# Patient Record
Sex: FEMALE | Race: WHITE | NOT HISPANIC OR LATINO | Employment: PART TIME | URBAN - METROPOLITAN AREA
[De-identification: names, ages, dates, MRNs, and addresses within clinical notes are randomized per-mention and may not be internally consistent; named-entity substitution may affect disease eponyms.]

---

## 2023-10-31 ENCOUNTER — HOSPITAL ENCOUNTER (EMERGENCY)
Facility: HOSPITAL | Age: 68
Discharge: HOME | End: 2023-10-31
Attending: EMERGENCY MEDICINE
Payer: MEDICARE

## 2023-10-31 VITALS
TEMPERATURE: 98 F | BODY MASS INDEX: 25.96 KG/M2 | HEART RATE: 71 BPM | HEIGHT: 63 IN | OXYGEN SATURATION: 94 % | SYSTOLIC BLOOD PRESSURE: 125 MMHG | RESPIRATION RATE: 18 BRPM | DIASTOLIC BLOOD PRESSURE: 61 MMHG | WEIGHT: 146.5 LBS

## 2023-10-31 DIAGNOSIS — H57.89 IRRITATION OF LEFT EYE: Primary | ICD-10-CM

## 2023-10-31 PROCEDURE — 99283 EMERGENCY DEPT VISIT LOW MDM: CPT

## 2023-10-31 PROCEDURE — 25000003 PHARM REV CODE 250: Performed by: PHYSICIAN ASSISTANT

## 2023-10-31 RX ORDER — ERYTHROMYCIN 5 MG/G
OINTMENT OPHTHALMIC
Qty: 1 G | Refills: 0 | Status: SHIPPED | OUTPATIENT
Start: 2023-10-31

## 2023-10-31 RX ADMIN — FLUORESCEIN SODIUM AND BENOXINATE HYDROCHLORIDE 2 DROP: 2.5; 4 SOLUTION OPHTHALMIC at 08:10

## 2023-10-31 NOTE — ED PROVIDER NOTES
History      Chief Complaint   Patient presents with    Eye Pain     L eye pain after puppy scratched her       Review of patient's allergies indicates:   Allergen Reactions    Codeine     Ibuprofen     Macrobid [nitrofurantoin monohyd/m-cryst]     Macrodantin [nitrofurantoin macrocrystal]     Sulfa (sulfonamide antibiotics)         HPI   HPI    10/31/2023, 8:21 AM   History obtained from the patient       History of Present Illness: Yolanda Quarles is a 68 y.o. female patient who presents to the Emergency Department for left eye pain.  She thinks her puppy may have scratched her eye but not sure.  She says she has had this pain before and has never known cause.  Admits to dry eye, uses lubricating drops         PCP: Yoseph Mayberry MD       Past Medical History:  Past Medical History:   Diagnosis Date    Anxiety and depression     Asthma     Herniated intervertebral disc of lumbar spine     HTN (hypertension)     Hyperlipidemia     Pneumonia 2020    VIRAL         Past Surgical History:  Past Surgical History:   Procedure Laterality Date    APPENDECTOMY      BILATERAL MASTECTOMY      PROPHYLACTIC WITH RECONSTRUCTION  (DR CASTRO)    CATARACT EXTRACTION, BILATERAL      DILATION AND CURETTAGE OF UTERUS      ECTOPIC PREGNANCY SURGERY      FALLOPIAN TUBOPLASTY      IVF      X 2    LAPAROSCOPY      TOTAL ABDOMINAL HYSTERECTOMY W/ BILATERAL SALPINGOOPHORECTOMY  1993           Family History:  Family History   Problem Relation Age of Onset    Breast cancer Mother 64    Diabetes Sister     Ovarian cancer Other            Social History:  Social History     Tobacco Use    Smoking status: Every Day    Smokeless tobacco: Never   Substance and Sexual Activity    Alcohol use: Not Currently    Drug use: Never    Sexual activity: Not Currently     Birth control/protection: Post-menopausal       ROS     Review of Systems   Eyes:  Positive for pain. Negative for visual disturbance.   Gastrointestinal:  Negative for vomiting.  "  Neurological:  Negative for headaches.       Physical Exam      Initial Vitals [10/31/23 0818]   BP Pulse Resp Temp SpO2   132/67 90 18 98.6 °F (37 °C) (!) 94 %      MAP       --         Physical Exam  Vital signs and nursing notes reviewed.  Constitutional: Patient is in NAD. Awake and alert. Well-developed and well-nourished.  Head: Atraumatic. Normocephalic.  Eyes:  EOM intact. No scleral icterus.  Left eye with no fluorescein uptake. No fb. No dendritic lesion.   ENT: Mucous membranes are moist.   Neck: Supple.  No meningismus  Cardiovascular: Regular rate and rhythm. No murmurs, rubs, or gallops.   Pulmonary/Chest: No respiratory distress. Clear to auscultation bilaterally. No wheezing, rales, or rhonchi.  Musculoskeletal: Moves all extremities. No edema.   Skin: Warm and dry.  Neurological: Awake and alert. No acute focal neurological deficits are appreciated.  Psychiatric: Normal affect. Good eye contact. Appropriate in content.      ED Course          Procedures  ED Vital Signs:  Vitals:    10/31/23 0818 10/31/23 0905 10/31/23 0910   BP: 132/67 (!) 134/58 125/61   Pulse: 90 72 71   Resp: 18  18   Temp: 98.6 °F (37 °C)  98.3 °F (36.8 °C)   TempSrc: Oral  Oral   SpO2: (!) 94% (!) 94% (!) 94%   Weight: 66.4 kg (146 lb 7.9 oz)     Height: 5' 3" (1.6 m)                   Imaging Results:  Imaging Results    None            The Emergency Provider reviewed the vital signs and test results, which are outlined above.    ED Discussion             Medication(s) given in the ER:  Medications   fluorescein-benoxinate 0.25-0.4% ophthalmic solution 2 drop (2 drops Left Eye Given by Provider 10/31/23 0830)            Follow-up Information       Schedule an appointment as soon as possible for a visit  with Juan Villar MD.    Specialty: Ophthalmology  Contact information:  [address] [facility]  [address]  [facility] [address] [phone]                                    Medication List        START taking these " medications      erythromycin ophthalmic ointment  Commonly known as: ROMYCIN  Place a 1/2 inch ribbon of ointment into the lower eyelid 4-5 times a day for 5 days            ASK your doctor about these medications      albuterol 2 MG Tab  Commonly known as: PROVENTIL     citalopram 10 MG tablet  Commonly known as: CeleXA     cyclobenzaprine 10 MG tablet  Commonly known as: FLEXERIL     gabapentin 100 MG capsule  Commonly known as: NEURONTIN     HYDROcodone-acetaminophen  mg per tablet  Commonly known as: NORCO     metoprolol tartrate 50 MG tablet  Commonly known as: LOPRESSOR     montelukast 10 mg tablet  Commonly known as: SINGULAIR     simvastatin 20 MG tablet  Commonly known as: ZOCOR     telmisartan 80 MG Tab  Commonly known as: MICARDIS               Where to Get Your Medications        These medications were sent to [facility] #[id number] - [place] - [address] AT [facility]  [address], [place] [address]      Phone: [phone]   erythromycin ophthalmic ointment             Medical Decision Making        All findings were reviewed with the patient/family in detail.   All remaining questions and concerns were addressed at that time.  Patient/family has been counseled regarding the need for follow-up as well as the indication to return to the emergency room should new or worrisome developments occur.        MDM                 Clinical Impression:        ICD-10-CM ICD-9-CM   1. Irritation of left eye  H57.89 379.99               Yanet Jones, MATT  10/31/23 2125